# Patient Record
Sex: MALE | Race: BLACK OR AFRICAN AMERICAN | NOT HISPANIC OR LATINO | URBAN - METROPOLITAN AREA
[De-identification: names, ages, dates, MRNs, and addresses within clinical notes are randomized per-mention and may not be internally consistent; named-entity substitution may affect disease eponyms.]

---

## 2017-09-13 ENCOUNTER — EMERGENCY (EMERGENCY)
Facility: HOSPITAL | Age: 32
LOS: 1 days | Discharge: HOME | End: 2017-09-13
Admitting: EMERGENCY MEDICINE
Payer: MEDICAID

## 2017-09-13 VITALS
TEMPERATURE: 98 F | SYSTOLIC BLOOD PRESSURE: 117 MMHG | DIASTOLIC BLOOD PRESSURE: 75 MMHG | RESPIRATION RATE: 18 BRPM | OXYGEN SATURATION: 98 % | HEART RATE: 86 BPM

## 2017-09-13 PROCEDURE — 99283 EMERGENCY DEPT VISIT LOW MDM: CPT

## 2017-09-13 NOTE — ED PROVIDER NOTE - CHPI ED SYMPTOMS NEG
no suicidal/no weakness/no disorientation/no paranoia/no hallucinations/no confusion/no agitation/no homicidal/no weight loss/no change in level of consciousness

## 2017-09-13 NOTE — ED PROVIDER NOTE - PROGRESS NOTE DETAILS
Spoke with Aditi at [facility]  Case Manger Patient does not like facility and became quite and refused to speak with staff Patient sent for medication noncompliance today am medication  states " he is not a threat to himself or anyone at facility" welcomed to return.

## 2017-09-13 NOTE — ED PROVIDER NOTE - MEDICAL DECISION MAKING DETAILS
This is a 31 year old Male PMHX schizophrenia BIBA from [facility] for evaluation for medication noncompliance. Patient states he was too tired to take his Protonix this am. Patient is calm and cooperative Denies any verbal or physical altercation  Denies SI/HI Denies AH/VH Per Aditi case manage patient welcome to return no other medical concerns or complaints treat to self or staff.

## 2017-09-13 NOTE — ED ADULT TRIAGE NOTE - CHIEF COMPLAINT QUOTE
Pt sent by [facility] for med non-compliance. Pt refused to take his meds this morning. Pt calm, co-operative, denies A/VH, denies SI/HI. States he did not take his meds because he was too tired.

## 2017-09-13 NOTE — ED PROVIDER NOTE - OBJECTIVE STATEMENT
This is a 31 year old Male PMHX schizophrenia BIBA from [facility] for evaluation for medication noncompliance. Patient states he was too tired to take his Protonix this am. Patient is calm and cooperative Denies any verbal or physical altercation  Denies SI/HI Denies AH/VH

## 2017-09-18 ENCOUNTER — EMERGENCY (EMERGENCY)
Facility: HOSPITAL | Age: 32
LOS: 1 days | Discharge: HOME | End: 2017-09-18
Attending: EMERGENCY MEDICINE | Admitting: EMERGENCY MEDICINE
Payer: MEDICAID

## 2017-09-18 VITALS
RESPIRATION RATE: 16 BRPM | TEMPERATURE: 99 F | DIASTOLIC BLOOD PRESSURE: 94 MMHG | SYSTOLIC BLOOD PRESSURE: 149 MMHG | HEART RATE: 116 BPM | OXYGEN SATURATION: 100 %

## 2017-09-18 PROCEDURE — 99284 EMERGENCY DEPT VISIT MOD MDM: CPT | Mod: 25

## 2017-09-18 NOTE — ED PROVIDER NOTE - OBJECTIVE STATEMENT
31 from [place] - has been a resident there for the last 2 weeks.  Was here a few days ago for med non compliance.  Has a history of schizophrenia.  States that he continues to have visual hallucinations.  No SI AH HI.  Does report taking his medications.  Tonight asked staff member there to call EMS to see why he is still hearing voices.   Pt without any other medical complaints or ingestions reported

## 2017-09-18 NOTE — ED PROVIDER NOTE - MEDICAL DECISION MAKING DETAILS
pt with history of schizophrenia.  Self called 911.  Here with no thoughts or apparent risk of harm to self or others.  No indication for emergent psychiatric admission.  Will provide transportation back to [place].  Explained need for medication management at that facility.

## 2017-09-29 ENCOUNTER — EMERGENCY (EMERGENCY)
Facility: HOSPITAL | Age: 32
LOS: 1 days | Discharge: HOME | End: 2017-09-29
Admitting: EMERGENCY MEDICINE
Payer: MEDICAID

## 2017-09-29 VITALS
RESPIRATION RATE: 15 BRPM | HEART RATE: 72 BPM | OXYGEN SATURATION: 98 % | SYSTOLIC BLOOD PRESSURE: 132 MMHG | TEMPERATURE: 99 F | DIASTOLIC BLOOD PRESSURE: 81 MMHG

## 2017-09-29 PROCEDURE — 99284 EMERGENCY DEPT VISIT MOD MDM: CPT

## 2017-09-29 RX ORDER — TETANUS TOXOID, REDUCED DIPHTHERIA TOXOID AND ACELLULAR PERTUSSIS VACCINE, ADSORBED 5; 2.5; 8; 8; 2.5 [IU]/.5ML; [IU]/.5ML; UG/.5ML; UG/.5ML; UG/.5ML
0.5 SUSPENSION INTRAMUSCULAR ONCE
Qty: 0 | Refills: 0 | Status: COMPLETED | OUTPATIENT
Start: 2017-09-29 | End: 2017-09-29

## 2017-09-29 RX ADMIN — TETANUS TOXOID, REDUCED DIPHTHERIA TOXOID AND ACELLULAR PERTUSSIS VACCINE, ADSORBED 0.5 MILLILITER(S): 5; 2.5; 8; 8; 2.5 SUSPENSION INTRAMUSCULAR at 23:02

## 2017-09-29 NOTE — ED PROVIDER NOTE - CHPI ED SYMPTOMS NEG
no vomiting/no dizziness/no numbness/no decreased eating/drinking/no nausea/no pain/no weakness/no fever/no chills/no tingling

## 2017-09-29 NOTE — ED PROVIDER NOTE - PHYSICAL EXAMINATION
Skin: [id number] area of erythema to posterior right ankle, blister appears to be hearing well, no drainage or crepitus noted

## 2017-09-29 NOTE — ED ADULT TRIAGE NOTE - CHIEF COMPLAINT QUOTE
Pt brought in by EMS from [facility] for opened blistered to R heel.  Pt c/o slight pain.  No drainage noted.  +pedal pulse.  Pt states opened x2 days ago.  PMHx: schizophrenia

## 2017-09-29 NOTE — ED PROVIDER NOTE - OBJECTIVE STATEMENT
31yM w/ pmhx schizophrenia brought in by EMS from [facility] for an open blister. Pt states that he wore new boots 2 days ago and they were too tight, he developed a blister. Pt states that he had pain to the area and the blister opened today. Denies fevers, chills, difficulty weight bearing, chest pain, shortness of breath, abdominal pain, n/v/d and any other complaints

## 2017-09-29 NOTE — ED PROVIDER NOTE - CARE PLAN
Principal Discharge DX:	Blister  Instructions for follow-up, activity and diet:	Follow up with your primary care provider within 48 hours  Wound Care: Keep wound clean and dry, wash gently with soap and water daily then reapply bacitracin and non-stick adhesive. Use cling wrap to secure the adhesive.   Return to the emergency department with any worsening or concerning symptoms, new onset fever, red streaking, swelling to area, new drainage from site or any other concerning symptoms.

## 2017-09-29 NOTE — ED PROVIDER NOTE - MEDICAL DECISION MAKING DETAILS
31yM w/ pmhx schizophrenia presenting with an open blister to his posterior right ankle, appears to have healed well, no signs of infection, normal vital signs. Will dress wound and d/c home with PMD follow up.

## 2017-09-29 NOTE — ED PROVIDER NOTE - PLAN OF CARE
Follow up with your primary care provider within 48 hours  Wound Care: Keep wound clean and dry, wash gently with soap and water daily then reapply bacitracin and non-stick adhesive. Use cling wrap to secure the adhesive.   Return to the emergency department with any worsening or concerning symptoms, new onset fever, red streaking, swelling to area, new drainage from site or any other concerning symptoms.

## 2017-10-21 ENCOUNTER — EMERGENCY (EMERGENCY)
Facility: HOSPITAL | Age: 32
LOS: 1 days | Discharge: HOME | End: 2017-10-21
Admitting: EMERGENCY MEDICINE
Payer: MEDICAID

## 2017-10-21 VITALS
HEART RATE: 82 BPM | OXYGEN SATURATION: 99 % | SYSTOLIC BLOOD PRESSURE: 140 MMHG | TEMPERATURE: 99 F | DIASTOLIC BLOOD PRESSURE: 88 MMHG | RESPIRATION RATE: 17 BRPM

## 2017-10-21 PROCEDURE — 99284 EMERGENCY DEPT VISIT MOD MDM: CPT

## 2017-10-21 NOTE — ED ADULT TRIAGE NOTE - CHIEF COMPLAINT QUOTE
pt from [facility] states he is having paranoid hallucinations and wants his medication adjusted. pt calm and corporative in triage.  NP Arash called to triage.  pt brought directly back
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
## 2017-10-21 NOTE — ED PROVIDER NOTE - MEDICAL DECISION MAKING DETAILS
30 y/o M hx Schizo-affective Disorder  Medical evaluation performed. There is no clinical evidence of intoxication or any acute medical problem requiring immediate intervention.  To follow up with [facility].

## 2017-10-21 NOTE — ED PROVIDER NOTE - OBJECTIVE STATEMENT
30 y/o M hx Schizo-affective Disorder  BIBA  w c/o  recurrent paranoid, stating that he needs his medication adjusted. Admits that this paranoid has persisted for years.  Denies recent use of alcohol or  illicit drug. Denies SI/HI/AH/VH. Denies pain, SOB, fever, chills, chest/ abdominal discomfort. Denies falling, punching  or kicking any objects.

## 2017-10-21 NOTE — ED ADULT NURSE NOTE - CHIEF COMPLAINT QUOTE
pt from [facility] states he is having paranoid hallucinations and wants his medication adjusted. pt calm and corporative in triage.  NP Arash called to triage.  pt brought directly back

## 2017-12-21 ENCOUNTER — EMERGENCY (EMERGENCY)
Facility: HOSPITAL | Age: 32
LOS: 1 days | Discharge: HOME | End: 2017-12-21
Admitting: EMERGENCY MEDICINE
Payer: MEDICAID

## 2017-12-21 VITALS
DIASTOLIC BLOOD PRESSURE: 82 MMHG | RESPIRATION RATE: 15 BRPM | SYSTOLIC BLOOD PRESSURE: 123 MMHG | TEMPERATURE: 99 F | HEART RATE: 90 BPM | OXYGEN SATURATION: 97 %

## 2017-12-21 PROCEDURE — 71020: CPT | Mod: 26

## 2017-12-21 PROCEDURE — 99284 EMERGENCY DEPT VISIT MOD MDM: CPT | Mod: 25

## 2017-12-21 NOTE — ED PROVIDER NOTE - MEDICAL DECISION MAKING DETAILS
32 year old male with a PMHx of GERD and schizophrenia pw nasal congestion and cough for 1 day.   Plan: nasal spray, cxr

## 2017-12-21 NOTE — ED ADULT TRIAGE NOTE - CHIEF COMPLAINT QUOTE
Pt c/o nasal congestion for approx 1 day.  Denies taking any OTC meds, states unrelieved with Vicks.  Endorses slight cough, no cough noted in triage.  PMHx:  GERD, schizophrenia

## 2017-12-21 NOTE — ED PROVIDER NOTE - PLAN OF CARE
Use nasal spray as directed. Drink plenty of water - stay hydrated. Follow up with your primary care physician within 1 week for further evaluation. Return to the Emergency Department for any new, worsening or concerning symptoms.

## 2017-12-21 NOTE — ED PROVIDER NOTE - CARE PLAN
Principal Discharge DX:	Viral syndrome  Instructions for follow-up, activity and diet:	Use nasal spray as directed. Drink plenty of water - stay hydrated. Follow up with your primary care physician within 1 week for further evaluation. Return to the Emergency Department for any new, worsening or concerning symptoms.

## 2017-12-21 NOTE — ED PROVIDER NOTE - OBJECTIVE STATEMENT
32 year old male with a PMHx of GERD and schizophrenia pw nasal congestion and cough for 1 day. Pt states that he has had a dry cough and congestion that has not gotten any better with OTC medication. Endorses that he has a few close contacts with similar symptoms. Denies headache, sore throat, n/v/f/c, CP, SOB, abdominal pain, dysuria, hematuria.

## 2018-02-02 ENCOUNTER — EMERGENCY (EMERGENCY)
Facility: HOSPITAL | Age: 33
LOS: 1 days | Discharge: HOME | End: 2018-02-02
Admitting: EMERGENCY MEDICINE
Payer: MEDICAID

## 2018-02-02 VITALS — DIASTOLIC BLOOD PRESSURE: 83 MMHG | SYSTOLIC BLOOD PRESSURE: 125 MMHG

## 2018-02-02 PROCEDURE — 99283 EMERGENCY DEPT VISIT LOW MDM: CPT

## 2018-02-02 RX ORDER — HALOPERIDOL DECANOATE 100 MG/ML
5 INJECTION INTRAMUSCULAR ONCE
Qty: 0 | Refills: 0 | Status: COMPLETED | OUTPATIENT
Start: 2018-02-02 | End: 2018-02-02

## 2018-02-02 RX ADMIN — HALOPERIDOL DECANOATE 5 MILLIGRAM(S): 100 INJECTION INTRAMUSCULAR at 16:51

## 2018-02-02 RX ADMIN — Medication 2 MILLIGRAM(S): at 16:51
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
## 2018-02-02 NOTE — ED PROVIDER NOTE - MEDICAL DECISION MAKING DETAILS
33 y/o M hx Schizophrenia   Behavior likely dude to chronic schizophrenia. Admits to medication compliance.  Medical evaluation performed. There is no clinical evidence of intoxication or any acute medical problem requiring immediate intervention.
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
## 2018-02-02 NOTE — ED BEHAVIORAL HEALTH NOTE - BEHAVIORAL HEALTH NOTE
Worker arranged for transportation through [employer] return back to [facility] [address] [place]. Worker spoke to Li who authorized pickup with nicole ([phone]) for 7:00pm trip #[phone]

## 2018-03-06 ENCOUNTER — EMERGENCY (EMERGENCY)
Facility: HOSPITAL | Age: 33
LOS: 1 days | Discharge: HOME | End: 2018-03-06
Attending: EMERGENCY MEDICINE | Admitting: EMERGENCY MEDICINE
Payer: MEDICAID

## 2018-03-06 VITALS
RESPIRATION RATE: 20 BRPM | DIASTOLIC BLOOD PRESSURE: 67 MMHG | HEART RATE: 69 BPM | OXYGEN SATURATION: 98 % | TEMPERATURE: 98 F | SYSTOLIC BLOOD PRESSURE: 124 MMHG

## 2018-03-06 VITALS
TEMPERATURE: 98 F | RESPIRATION RATE: 18 BRPM | HEART RATE: 74 BPM | SYSTOLIC BLOOD PRESSURE: 125 MMHG | DIASTOLIC BLOOD PRESSURE: 68 MMHG | OXYGEN SATURATION: 100 %

## 2018-03-06 PROCEDURE — 99283 EMERGENCY DEPT VISIT LOW MDM: CPT | Mod: 25

## 2018-03-06 NOTE — ED PROVIDER NOTE - OBJECTIVE STATEMENT
33 yo from [address] at [place] reporting that he has a lot of stress building up with people in the house.  He feels safe and there is no SI HI AH VH.  Takes his meds as prescribed.  Is asking to have a few hours to relax and stay here prior to going back.  Denies any ingestions.  Presented on his own accord.

## 2018-03-06 NOTE — ED ADULT TRIAGE NOTE - CHIEF COMPLAINT QUOTE
As per pt  he is under a lot of stress for the past 5 months and wants to talk to a psychiatrist. Denies any homicidal or suicidal ideation. Denies drug or alcohol use. Pt claims that he takes his psych meds regularly. PMH of schizophrenia.

## 2018-03-06 NOTE — ED ADULT NURSE NOTE - OBJECTIVE STATEMENT
pt recd to behavioral health a*ox3 ambulatory, pmh schizophrenia, states he has been feeling stressed and would like to decompress. pt denies any suicidal or homicidal ideation, no auditory or visual hallucinations, patient offers no other complaints of pain or discomfort and is calm and cooperative with interview, vitals stable resps even and uinlabored. pts belongings secured in closet, will ctm and maintain safety while in .

## 2018-03-06 NOTE — ED ADULT NURSE REASSESSMENT NOTE - NS ED NURSE REASSESS COMMENT FT1
pt discharged, paperwork signed, ambulatory without assist, pt verbalizes that he will take q46 back to [facility], pt denies any medical or psychiatric complaints, all belongings returned, pt escorted to bus stop.

## 2018-03-06 NOTE — ED PROVIDER NOTE - MEDICAL DECISION MAKING DETAILS
pt presenting from [place].  No acute medical or psychaitric interventions needed. Will allow to rest here and then reassess.

## 2018-03-10 ENCOUNTER — EMERGENCY (EMERGENCY)
Facility: HOSPITAL | Age: 33
LOS: 1 days | Discharge: HOME | End: 2018-03-10
Attending: EMERGENCY MEDICINE | Admitting: EMERGENCY MEDICINE
Payer: MEDICAID

## 2018-03-10 VITALS
RESPIRATION RATE: 18 BRPM | SYSTOLIC BLOOD PRESSURE: 117 MMHG | HEART RATE: 59 BPM | TEMPERATURE: 98 F | DIASTOLIC BLOOD PRESSURE: 81 MMHG | OXYGEN SATURATION: 100 %

## 2018-03-10 VITALS
SYSTOLIC BLOOD PRESSURE: 123 MMHG | RESPIRATION RATE: 16 BRPM | OXYGEN SATURATION: 100 % | HEART RATE: 67 BPM | TEMPERATURE: 98 F | DIASTOLIC BLOOD PRESSURE: 80 MMHG

## 2018-03-10 PROCEDURE — 99283 EMERGENCY DEPT VISIT LOW MDM: CPT | Mod: 25

## 2018-03-10 NOTE — ED ADULT NURSE NOTE - CHIEF COMPLAINT QUOTE
Pt C/O increased stress for last few days. Pt denies SI/HI, hallucinations, drug/ ETOH use, pain or any medical complaints at this time. Pt has PMH Schizophrenia, states up to date with all meds. Pt resides at [facility] building [address]. Pt Calm and cooperative in triage. Triage eval by MD Esteban: Pt OK to go to . Pt transported to .

## 2018-03-10 NOTE — ED PROVIDER NOTE - MEDICAL DECISION MAKING DETAILS
no medical complaints; no acute psychiatric condition to warrant psych consultation; will have pt speak to psychiatrist at [place]

## 2018-03-10 NOTE — ED PROVIDER NOTE - OBJECTIVE STATEMENT
32 yr old male with hx of anxiety presents from [place] for increasing stressors.  Denies HI/SI.  Denies taking any additional medications today. 32 yr old male with hx of schizophrenia, GERD presents from [place] for increasing stressors.  Denies HI/SI.  Denies taking any additional medications today.

## 2018-03-10 NOTE — ED ADULT TRIAGE NOTE - CHIEF COMPLAINT QUOTE
Pt C/O increased stress for last few days. Pt denies SI/HI, hallucinations, drug/ ETOH use, pain or any medical complaints at this time. Pt has PMH Schizophrenia, states up to date with all meds. Pt resides at [facility] building [address]. Pt Calm and cooperative in triage. Triage eval by MD Esteban: Pt OK to go to . Pt transported to .

## 2018-03-13 ENCOUNTER — EMERGENCY (EMERGENCY)
Facility: HOSPITAL | Age: 33
LOS: 1 days | Discharge: HOME | End: 2018-03-13
Admitting: EMERGENCY MEDICINE
Payer: MEDICAID

## 2018-03-13 VITALS
RESPIRATION RATE: 18 BRPM | HEART RATE: 91 BPM | DIASTOLIC BLOOD PRESSURE: 92 MMHG | OXYGEN SATURATION: 99 % | SYSTOLIC BLOOD PRESSURE: 134 MMHG | TEMPERATURE: 98 F

## 2018-03-13 PROCEDURE — 99283 EMERGENCY DEPT VISIT LOW MDM: CPT | Mod: 25

## 2018-03-13 NOTE — ED PROVIDER NOTE - MEDICAL DECISION MAKING DETAILS
This is a 32 yr old male with hx of schizophrenia, GERD presents from [place] for increasing stressors.  Patient confirms that he stated to triage RN " I want humans to be extinct." Patient is vague and elusive with questions. Asking for admission to [facility]. Medical evaluation performed. There is no clinical evidence of intoxication or any acute medical problem requiring immediate intervention. Final disposition will be determined by psychiatrist.

## 2018-03-13 NOTE — ED ADULT NURSE NOTE - OBJECTIVE STATEMENT
pt arrives a*ox3 ambulatory to , states he is "feeling stressed at home and needs to relax," states he "just doesn't like some people," denies any si hi ah vh. calm and cooperative with ed staff interventions, states he is "sometimes," compliant with medication, nad noted. awaiting medical and psych eval. will ctm and reassess, clothing secured, will maintain safety.

## 2018-03-13 NOTE — ED ADULT TRIAGE NOTE - CHIEF COMPLAINT QUOTE
Pt states "I came to the hospital today because I am feeling stressed out". Denies SI or HI but states he would like "humans to be extinct". PMH schizophrenia. Takes his medications "sometimes". Pt will be seen in .

## 2018-03-13 NOTE — ED BEHAVIORAL HEALTH NOTE - BEHAVIORAL HEALTH NOTE
Writer called Lianet Magana, [phone], and left a voicemail message, requesting a return call to provide collateral information, but was not able to speak with her. Marisolr called [facility], [place] [address], at [place], the patient's [facility] facility, [phone], and was told the on-call  is Cecilia Rios, [phone]. Marisolr called her and left a voicemail message but was not able to speak with her. Marisolr left a number for her to call after the end of 's shift to speak with the evaluating psychiatrist.

## 2018-03-14 VITALS
TEMPERATURE: 98 F | HEART RATE: 78 BPM | RESPIRATION RATE: 17 BRPM | SYSTOLIC BLOOD PRESSURE: 122 MMHG | OXYGEN SATURATION: 99 % | DIASTOLIC BLOOD PRESSURE: 78 MMHG

## 2018-03-14 NOTE — ED ADULT NURSE REASSESSMENT NOTE - NS ED NURSE REASSESS COMMENT FT1
patient cleared by psych and medicine, verbalizes he will take the q46 home to [facility]. denies complaints at this time.

## 2018-09-30 NOTE — ED ADULT NURSE NOTE - OBJECTIVE STATEMENT
Addended by: ALTON CHI on: 9/30/2018 12:40 PM     Modules accepted: Orders     31 yo M received in .  pt is A&Ox4.  Pt is well known to  ED and states he has been feeling stressed and would like to decompress.  Pt denies any suicidal or homicidal ideation, no auditory or visual hallucinations, patient offers no other complaints of pain or discomfort and is calm and cooperative with interview, vitals stable resps even and uinlabored. pts belongings secured in closet, will ctm and maintain safety while in .

## 2018-12-16 ENCOUNTER — EMERGENCY (EMERGENCY)
Facility: HOSPITAL | Age: 33
LOS: 1 days | Discharge: HOME | End: 2018-12-16
Attending: EMERGENCY MEDICINE | Admitting: EMERGENCY MEDICINE
Payer: MEDICAID

## 2018-12-16 VITALS
WEIGHT: 147.93 LBS | DIASTOLIC BLOOD PRESSURE: 73 MMHG | OXYGEN SATURATION: 99 % | HEIGHT: 68 IN | HEART RATE: 83 BPM | RESPIRATION RATE: 16 BRPM | TEMPERATURE: 98 F | SYSTOLIC BLOOD PRESSURE: 145 MMHG

## 2018-12-16 PROBLEM — F20.9 SCHIZOPHRENIA, UNSPECIFIED: Chronic | Status: ACTIVE | Noted: 2017-09-18

## 2018-12-16 PROBLEM — K21.9 GASTRO-ESOPHAGEAL REFLUX DISEASE WITHOUT ESOPHAGITIS: Chronic | Status: ACTIVE | Noted: 2017-12-21

## 2018-12-16 PROCEDURE — 99282 EMERGENCY DEPT VISIT SF MDM: CPT | Mod: 25

## 2018-12-16 NOTE — ED ADULT TRIAGE NOTE - CHIEF COMPLAINT QUOTE
Pt arrives to ED c/o feeling depressed for the last 2 weeks.  Pt denies current SI/HI and denies auditory or visual hallucinations.  Pt reports that a few years ago he did experience SI/HI and tried putting himself in "harm's way." Pt has hx of schizophrenia paranoid type.  Pt denies ETOH or drug use.  Pt takes injection for his Schizophrenia.  Pt calm and cooperative in triage. Pt arrives to ED c/o feeling depressed for the last 2 weeks.  Pt denies current SI/HI and denies auditory or visual hallucinations.  Pt reports that a few years ago he did experience SI/HI and tried putting himself in "harm's way." Pt has hx of schizophrenia paranoid type.  Pt denies ETOH or drug use.  Pt takes injection for his Schizophrenia.  Pt calm and cooperative in triage.  Attending CHRISTIAN Del Angel to evaluate pt in triage. Pt arrives to ED c/o feeling depressed for the last 2 weeks.  Pt denies current SI/HI and denies auditory or visual hallucinations.  Pt reports that a few years ago he did experience SI/HI and tried putting himself in "harm's way." Pt has hx of schizophrenia paranoid type.  Pt denies ETOH or drug use.  Pt takes injection for his Schizophrenia.  Pt calm and cooperative in triage.  Attending CHRISTIAN Del Angel to evaluate pt in triage.  Pt lives in Group home and reports he does not like it there.

## 2018-12-16 NOTE — ED ADULT NURSE NOTE - OBJECTIVE STATEMENT
BIB self from [place] residence, states "I just needed to get away for a while". Pt arrives awake, a&ox3, calm with flat affect. Pt denies s/i h/i or a/v/h. Denies physical complaints. Pt reports compliance with medications prescribed. No reported drug/etoh use.

## 2018-12-16 NOTE — ED PROVIDER NOTE - OBJECTIVE STATEMENT
32 y/o M with h/o schizophrenia compliant with LOVELL presents c/o problems at his residence.  Pt lives at [facility] [address] [phone]), states he has been there for 9 months.  Pt is vague with history, but describes some problems at his residence.  Pt initially c/o depression in triage, but upon further evaluation he reports he has been feeling this way "for a while" with no new changes.  He denies current SI/HI, AH/VH.  No recent drug or alcohol use.  Upon further questioning, he states he just wanted to "get away" for a while and is requesting to stay in ER for a couple hours.

## 2018-12-16 NOTE — ED ADULT NURSE NOTE - NSIMPLEMENTINTERV_GEN_ALL_ED
Implemented All Universal Safety Interventions:  [place] to call system. Call bell, personal items and telephone within reach. Instruct patient to call for assistance. Room bathroom lighting operational. Non-slip footwear when patient is off stretcher. Physically safe environment: no spills, clutter or unnecessary equipment. Stretcher in lowest position, wheels locked, appropriate side rails in place.

## 2018-12-16 NOTE — ED PROVIDER NOTE - MEDICAL DECISION MAKING DETAILS
34 y/o M presenting with vague complaints, reports he is trying to "get away" as he is unhappy at his residence and needs a night away (pt with mult ER visits for same).  Offered SW for long-term assistance with residence, but pt states that "I have the ACT team for that".  There is no e/o acute intoxication or psychiatric decompensation.  Pt requesting metrocard to return to residence.

## 2018-12-16 NOTE — ED ADULT NURSE NOTE - CHIEF COMPLAINT QUOTE
Pt arrives to ED c/o feeling depressed for the last 2 weeks.  Pt denies current SI/HI and denies auditory or visual hallucinations.  Pt reports that a few years ago he did experience SI/HI and tried putting himself in "harm's way." Pt has hx of schizophrenia paranoid type.  Pt denies ETOH or drug use.  Pt takes injection for his Schizophrenia.  Pt calm and cooperative in triage.  Attending CHRISTIAN Del Angel to evaluate pt in triage.  Pt lives in Group home and reports he does not like it there.

## 2018-12-16 NOTE — ED PROVIDER NOTE - NSFOLLOWUPINSTRUCTIONS_ED_ALL_ED_FT
Follow-up with your psychiatrist.  You can also follow-up at the [facility] [phone].  Return to the emergency department if you develop any new or worsening symptoms, thoughts of hurting yourself or others, or any other concerns.

## 2020-02-27 NOTE — ED PROVIDER NOTE - NS ED MD DISPO DISCHARGE
Per monthly Recall Report,  Letter was mailed to patient to inform them that they are due for an appointment with Dr James Latham.    Reason for visit: Return in about 3 months (around 3/18/2020).  Date last seen: 12-     Home

## 2020-08-18 ENCOUNTER — EMERGENCY (EMERGENCY)
Facility: HOSPITAL | Age: 35
LOS: 1 days | Discharge: NURSING, REHAB OR LONG-TERM CARE | End: 2020-08-18
Admitting: EMERGENCY MEDICINE
Payer: MEDICAID

## 2020-08-18 VITALS
OXYGEN SATURATION: 100 % | RESPIRATION RATE: 16 BRPM | SYSTOLIC BLOOD PRESSURE: 132 MMHG | TEMPERATURE: 98 F | HEART RATE: 97 BPM | DIASTOLIC BLOOD PRESSURE: 81 MMHG

## 2020-08-18 LAB
ALBUMIN SERPL ELPH-MCNC: 5 G/DL — SIGNIFICANT CHANGE UP (ref 3.3–5)
ALP SERPL-CCNC: 71 U/L — SIGNIFICANT CHANGE UP (ref 40–120)
ALT FLD-CCNC: 25 U/L — SIGNIFICANT CHANGE UP (ref 4–41)
AMPHET UR-MCNC: NEGATIVE — SIGNIFICANT CHANGE UP
ANION GAP SERPL CALC-SCNC: 13 MMO/L — SIGNIFICANT CHANGE UP (ref 7–14)
APAP SERPL-MCNC: < 15 UG/ML — LOW (ref 15–25)
APPEARANCE UR: CLEAR — SIGNIFICANT CHANGE UP
AST SERPL-CCNC: 25 U/L — SIGNIFICANT CHANGE UP (ref 4–40)
BARBITURATES UR SCN-MCNC: NEGATIVE — SIGNIFICANT CHANGE UP
BASOPHILS # BLD AUTO: 0.02 K/UL — SIGNIFICANT CHANGE UP (ref 0–0.2)
BASOPHILS NFR BLD AUTO: 0.5 % — SIGNIFICANT CHANGE UP (ref 0–2)
BENZODIAZ UR-MCNC: NEGATIVE — SIGNIFICANT CHANGE UP
BILIRUB SERPL-MCNC: 0.2 MG/DL — SIGNIFICANT CHANGE UP (ref 0.2–1.2)
BILIRUB UR-MCNC: NEGATIVE — SIGNIFICANT CHANGE UP
BLOOD UR QL VISUAL: NEGATIVE — SIGNIFICANT CHANGE UP
BUN SERPL-MCNC: 13 MG/DL — SIGNIFICANT CHANGE UP (ref 7–23)
CALCIUM SERPL-MCNC: 9.7 MG/DL — SIGNIFICANT CHANGE UP (ref 8.4–10.5)
CANNABINOIDS UR-MCNC: NEGATIVE — SIGNIFICANT CHANGE UP
CHLORIDE SERPL-SCNC: 105 MMOL/L — SIGNIFICANT CHANGE UP (ref 98–107)
CO2 SERPL-SCNC: 23 MMOL/L — SIGNIFICANT CHANGE UP (ref 22–31)
COCAINE METAB.OTHER UR-MCNC: NEGATIVE — SIGNIFICANT CHANGE UP
COLOR SPEC: YELLOW — SIGNIFICANT CHANGE UP
CREAT SERPL-MCNC: 1.13 MG/DL — SIGNIFICANT CHANGE UP (ref 0.5–1.3)
EOSINOPHIL # BLD AUTO: 0.07 K/UL — SIGNIFICANT CHANGE UP (ref 0–0.5)
EOSINOPHIL NFR BLD AUTO: 1.7 % — SIGNIFICANT CHANGE UP (ref 0–6)
ETHANOL BLD-MCNC: < 10 MG/DL — SIGNIFICANT CHANGE UP
GLUCOSE SERPL-MCNC: 127 MG/DL — HIGH (ref 70–99)
GLUCOSE UR-MCNC: NEGATIVE — SIGNIFICANT CHANGE UP
HCT VFR BLD CALC: 38.9 % — LOW (ref 39–50)
HGB BLD-MCNC: 12.7 G/DL — LOW (ref 13–17)
IMM GRANULOCYTES NFR BLD AUTO: 0.2 % — SIGNIFICANT CHANGE UP (ref 0–1.5)
KETONES UR-MCNC: NEGATIVE — SIGNIFICANT CHANGE UP
LEUKOCYTE ESTERASE UR-ACNC: NEGATIVE — SIGNIFICANT CHANGE UP
LYMPHOCYTES # BLD AUTO: 1.2 K/UL — SIGNIFICANT CHANGE UP (ref 1–3.3)
LYMPHOCYTES # BLD AUTO: 29.6 % — SIGNIFICANT CHANGE UP (ref 13–44)
MCHC RBC-ENTMCNC: 26.4 PG — LOW (ref 27–34)
MCHC RBC-ENTMCNC: 32.6 % — SIGNIFICANT CHANGE UP (ref 32–36)
MCV RBC AUTO: 80.9 FL — SIGNIFICANT CHANGE UP (ref 80–100)
METHADONE UR-MCNC: NEGATIVE — SIGNIFICANT CHANGE UP
MONOCYTES # BLD AUTO: 0.37 K/UL — SIGNIFICANT CHANGE UP (ref 0–0.9)
MONOCYTES NFR BLD AUTO: 9.1 % — SIGNIFICANT CHANGE UP (ref 2–14)
NEUTROPHILS # BLD AUTO: 2.39 K/UL — SIGNIFICANT CHANGE UP (ref 1.8–7.4)
NEUTROPHILS NFR BLD AUTO: 58.9 % — SIGNIFICANT CHANGE UP (ref 43–77)
NITRITE UR-MCNC: NEGATIVE — SIGNIFICANT CHANGE UP
NRBC # FLD: 0 K/UL — SIGNIFICANT CHANGE UP (ref 0–0)
OPIATES UR-MCNC: NEGATIVE — SIGNIFICANT CHANGE UP
OXYCODONE UR-MCNC: NEGATIVE — SIGNIFICANT CHANGE UP
PCP UR-MCNC: NEGATIVE — SIGNIFICANT CHANGE UP
PH UR: 6.5 — SIGNIFICANT CHANGE UP (ref 5–8)
PLATELET # BLD AUTO: 176 K/UL — SIGNIFICANT CHANGE UP (ref 150–400)
PMV BLD: 11.2 FL — SIGNIFICANT CHANGE UP (ref 7–13)
POTASSIUM SERPL-MCNC: 3.9 MMOL/L — SIGNIFICANT CHANGE UP (ref 3.5–5.3)
POTASSIUM SERPL-SCNC: 3.9 MMOL/L — SIGNIFICANT CHANGE UP (ref 3.5–5.3)
PROT SERPL-MCNC: 7.5 G/DL — SIGNIFICANT CHANGE UP (ref 6–8.3)
PROT UR-MCNC: 10 — SIGNIFICANT CHANGE UP
RBC # BLD: 4.81 M/UL — SIGNIFICANT CHANGE UP (ref 4.2–5.8)
RBC # FLD: 13.5 % — SIGNIFICANT CHANGE UP (ref 10.3–14.5)
SALICYLATES SERPL-MCNC: < 5 MG/DL — LOW (ref 15–30)
SODIUM SERPL-SCNC: 141 MMOL/L — SIGNIFICANT CHANGE UP (ref 135–145)
SP GR SPEC: 1.02 — SIGNIFICANT CHANGE UP (ref 1–1.04)
TSH SERPL-MCNC: 0.46 UIU/ML — SIGNIFICANT CHANGE UP (ref 0.27–4.2)
UROBILINOGEN FLD QL: NORMAL — SIGNIFICANT CHANGE UP
WBC # BLD: 4.06 K/UL — SIGNIFICANT CHANGE UP (ref 3.8–10.5)
WBC # FLD AUTO: 4.06 K/UL — SIGNIFICANT CHANGE UP (ref 3.8–10.5)

## 2020-08-18 PROCEDURE — 90792 PSYCH DIAG EVAL W/MED SRVCS: CPT

## 2020-08-18 PROCEDURE — 99285 EMERGENCY DEPT VISIT HI MDM: CPT

## 2020-08-18 NOTE — ED PROVIDER NOTE - NSFOLLOWUPINSTRUCTIONS_ED_ALL_ED_FT
Please follow up with your primary care doctor after you leave the emergency department so that they can follow up and conduct more testing and treatment as they deem necessary. If you have worsening signs or symptoms of what you came in to the Emergency Department today and are not able to see your doctor, go to your nearest emergency department or return to the [facility] emergency department for further care and management.
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
## 2020-08-18 NOTE — ED BEHAVIORAL HEALTH ASSESSMENT NOTE - ORIENTED TO TIME
Patient presented to office for follow up appointment 3/11/2020. Patient needs 2 additional infusions of solumedrol 500 mg in office. May need to go to cancer center tomorrow. No

## 2020-08-18 NOTE — ED BEHAVIORAL HEALTH ASSESSMENT NOTE - CASE SUMMARY
34/M  with hx of Schizophrenia, hx of noncompliance to meds, hx of in-pt psych admissions and no substance abuse hx.  Today, BIB EMS due to agitation.  At this time, he is not manifesting symptoms of severe MDD, acute william or florid psychotic.  Pt reports being upset by visit from the ACT team which he feels, is "invading his privacy".  Despite being irate, there was no escalation towards violence.  Pt was able to emotionally regulate (even on a limited basis).  Since his  ED arrival, the Pt has been calm and cooperative.  There has been recurrence of agitation/aggressive behavior.  Currently, no verbalization of active/ passive SI/HI.   Pt is not showing any signs/symptoms of intoxication or withdrawal.  Pt is not delirious.  He has not tested limits.. Has maintained appropriate boundaries. Pt has been easily redirected.  Collateral information was obtained from [facility] staff/ ACT Team member who both reported there has been no reported changes in Pt's baseline behavior.  Pt's  baseline is guarded and indifferent. Pt said to not share much. Pt reportedly is in denial of mental illness.  There was no opposition towards Pt being discharged back to the community.  Pt was able to partake towards safety planning.  Hence, Pt can be discharged back to the community.    RECOMMENDATIONS:        1. Psychoeducation provided.  Encouraged to be compliant with prescribed psych meds for control of symptoms.   No indication towards changing psych meds at this time.        2. Emergency protocol reviewed.  Pt was adviced to call 911 or come to the nearest ED should symptoms worsen; have increasing bouts of agitation/aggressive behavior; having SI/HI; or call [phone]       3. follow up with AOT - [place] ACT team as scheduled

## 2020-08-18 NOTE — ED BEHAVIORAL HEALTH ASSESSMENT NOTE - SUMMARY
Patient is a 35 y/o single AA male, domiciled on the grounds of [place] [address], PPH of Schizophrenia, denies past suicide attempts, denies recent substance abuse, biba, called by residence for psychiatric evaluation.  Patient has been calm and cooperative since his arrival to . He denies recent or current depressed mood or thoughts of hurting self or others. Patient also denies symptoms of william or psychosis. He does not appear to be manic or to be responding to internal stimuli. Patient does not appear to be an imminent danger to self or others and does not require inpatient hospitalization at this time. Patient is a 35 y/o single AA male, domiciled on the grounds of [place] [address], PPH of Schizophrenia, denies past suicide attempts, denies recent substance abuse, biba, called by residence for psychiatric evaluation.    Patient has been calm and cooperative since his arrival to . He denies recent or current depressed mood or thoughts of hurting self or others. Patient also denies symptoms of william or psychosis. He does not appear to be manic or to be responding to internal stimuli. Patient does not appear to be an imminent danger to self or others and does not require inpatient hospitalization at this time.
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
## 2020-08-18 NOTE — ED BEHAVIORAL HEALTH ASSESSMENT NOTE - DESCRIPTION
calm and cooperative  ICU Vital Signs Last 24 Hrs  T(C): 36.8 (18 Aug 2020 19:11), Max: 36.8 (18 Aug 2020 19:11)  T(F): 98.3 (18 Aug 2020 19:11), Max: 98.3 (18 Aug 2020 19:11)  HR: 97 (18 Aug 2020 19:11) (97 - 97)  BP: 132/81 (18 Aug 2020 19:11) (132/81 - 132/81)  BP(mean): --  ABP: --  ABP(mean): --  RR: 16 (18 Aug 2020 19:11) (16 - 16)  SpO2: 100% (18 Aug 2020 19:11) (100% - 100%) GERD Lives on the grounds of [facility], [address], disabled

## 2020-08-18 NOTE — ED BEHAVIORAL HEALTH ASSESSMENT NOTE - RISK ASSESSMENT
H/o of schizophrenia and noncompliance with medication he is receiving a LOVELL, on AOT and has supportive residence. Low Acute Suicide Risk
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
## 2020-08-18 NOTE — ED PROVIDER NOTE - PATIENT PORTAL LINK FT
You can access the FollowMyHealth Patient Portal offered by [facility] by registering at the following website: [email or web address]. By joining [email or web address]’s FollowMyHealth portal, you will also be able to view your health information using other applications (apps) compatible with our system.

## 2020-08-18 NOTE — ED PROVIDER NOTE - CLINICAL SUMMARY MEDICAL DECISION MAKING FREE TEXT BOX
Labs, Urine Tox/UA, EKG   Medical evaluation performed. There is no clinical evidence of intoxication or any acute medical problem requiring immediate intervention. Patient is awaiting psychiatric consultation. Final disposition will be determined by psychiatrist.
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
## 2020-08-18 NOTE — ED BEHAVIORAL HEALTH ASSESSMENT NOTE - ADULT OR CHILD PROTECTIVE SERVICES INVOLVEMENT
Patient seen and examined. I have reviewed the history and physical and examined the patient and find no relevant changes. Surgery plan reviewed  Site marked and consent verified. All questions answered and antibiotic verified. Ready for right total knee arthroplasty    Yoselin Suero.  Aurelio, [address] [place] [address] and Sports Medicine  01/28/20  6:52 AM
No

## 2020-08-18 NOTE — ED BEHAVIORAL HEALTH ASSESSMENT NOTE - VIOLENCE RISK FACTORS:
Feeling of being under threat and being unable to control threat/Violent ideation/threat/speech/Lack of insight into violence risk/need for treatment

## 2020-08-18 NOTE — ED PROVIDER NOTE - PROGRESS NOTE DETAILS
MICHELLE: I was signed out this pt pending PSYCH recs. Per psych, pt is cleared to be discharged back to [facility]. See Psych note for details. Pt has no medical complaints and labs are unremarkable. Will discharge back to [facility].

## 2020-08-18 NOTE — ED BEHAVIORAL HEALTH ASSESSMENT NOTE - SUICIDE PROTECTIVE FACTORS
Identifies reasons for living/Has future plans/Supportive social network of family or friends/Fear of death or the actual act of killing self

## 2020-08-19 DIAGNOSIS — F48.9 NONPSYCHOTIC MENTAL DISORDER, UNSPECIFIED: ICD-10-CM

## 2020-08-19 DIAGNOSIS — F20.9 SCHIZOPHRENIA, UNSPECIFIED: ICD-10-CM

## 2020-08-19 LAB — SARS-COV-2 RNA SPEC QL NAA+PROBE: SIGNIFICANT CHANGE UP

## 2020-08-19 NOTE — ED BEHAVIORAL HEALTH NOTE - BEHAVIORAL HEALTH NOTE
As per psychiatry, pt cleared for discharge at this time. Writer contacted [facility] [address] at [phone] and spoke with Clifford. Clifford informed of pt's discharge. No concerns reported. Mode of transportation as confirmed prior is INDEPENDENTLY via [employer].  ACT team also contacted and notified of discharge.     Writer arranged d/c transportation via [employer] online portal. Transportation arranged with [employer] #[phone] with invoice #[id number]. Artemio contacted providing ETA of 25-30 minutes. As per psychiatry, pt cleared for discharge at this time. Writer contacted [facility] [address] at [phone] and spoke with Clifford. Clifford informed of pt's discharge. No concerns reported. Mode of transportation as confirmed prior is INDEPENDENTLY via [employer].  ACT team also contacted with VM left.      Writer arranged d/c transportation via [employer] online portal. Transportation arranged with [employer] #[phone] with invoice #[id number]. Artemio contacted providing ETA of 25-30 minutes.

## 2020-08-21 LAB
SARS-COV-2 IGG SERPL IA-ACNC: 0.7 RATIO — SIGNIFICANT CHANGE UP
SARS-COV-2 IGG SERPL QL IA: NEGATIVE — SIGNIFICANT CHANGE UP
SARS-COV-2 IGG SERPL QL IA: NEGATIVE — SIGNIFICANT CHANGE UP
SARS-COV-2 IGM SERPL IA-ACNC: 0.25 RATIO — SIGNIFICANT CHANGE UP

## 2021-07-11 NOTE — ED PROVIDER NOTE - NS ED ATTENDING STATEMENT MOD
Pt was offered crutches and pt declines.  States she has a walker at home and also a rollator to use when grocery shopping Attending Only

## 2021-09-30 ENCOUNTER — EMERGENCY (EMERGENCY)
Facility: HOSPITAL | Age: 36
LOS: 0 days | Discharge: HOME | End: 2021-09-30
Attending: EMERGENCY MEDICINE
Payer: MEDICAID

## 2021-09-30 VITALS
RESPIRATION RATE: 16 BRPM | DIASTOLIC BLOOD PRESSURE: 83 MMHG | TEMPERATURE: 98 F | SYSTOLIC BLOOD PRESSURE: 128 MMHG | OXYGEN SATURATION: 97 % | HEART RATE: 75 BPM

## 2021-09-30 VITALS
OXYGEN SATURATION: 98 % | HEART RATE: 73 BPM | WEIGHT: 139.99 LBS | SYSTOLIC BLOOD PRESSURE: 120 MMHG | DIASTOLIC BLOOD PRESSURE: 82 MMHG | TEMPERATURE: 98 F | RESPIRATION RATE: 19 BRPM | HEIGHT: 68 IN

## 2021-09-30 DIAGNOSIS — F32.9 MAJOR DEPRESSIVE DISORDER, SINGLE EPISODE, UNSPECIFIED: ICD-10-CM

## 2021-09-30 DIAGNOSIS — Z00.00 ENCOUNTER FOR GENERAL ADULT MEDICAL EXAMINATION WITHOUT ABNORMAL FINDINGS: ICD-10-CM

## 2021-09-30 DIAGNOSIS — K21.9 GASTRO-ESOPHAGEAL REFLUX DISEASE WITHOUT ESOPHAGITIS: ICD-10-CM

## 2021-09-30 DIAGNOSIS — F20.9 SCHIZOPHRENIA, UNSPECIFIED: ICD-10-CM

## 2021-09-30 DIAGNOSIS — Z20.822 CONTACT WITH AND (SUSPECTED) EXPOSURE TO COVID-19: ICD-10-CM

## 2021-09-30 LAB
ALBUMIN SERPL ELPH-MCNC: 4.2 G/DL — SIGNIFICANT CHANGE UP (ref 3.3–5)
ALP SERPL-CCNC: 79 U/L — SIGNIFICANT CHANGE UP (ref 40–120)
ALT FLD-CCNC: 23 U/L — SIGNIFICANT CHANGE UP (ref 12–78)
AMPHET UR-MCNC: NEGATIVE — SIGNIFICANT CHANGE UP
ANION GAP SERPL CALC-SCNC: 5 MMOL/L — SIGNIFICANT CHANGE UP (ref 5–17)
ANISOCYTOSIS BLD QL: SLIGHT — SIGNIFICANT CHANGE UP
APAP SERPL-MCNC: < 2 UG/ML (ref 10–30)
AST SERPL-CCNC: 22 U/L — SIGNIFICANT CHANGE UP (ref 15–37)
BARBITURATES UR SCN-MCNC: NEGATIVE — SIGNIFICANT CHANGE UP
BASOPHILS # BLD AUTO: 0 K/UL — SIGNIFICANT CHANGE UP (ref 0–0.2)
BASOPHILS NFR BLD AUTO: 0 % — SIGNIFICANT CHANGE UP (ref 0–2)
BENZODIAZ UR-MCNC: NEGATIVE — SIGNIFICANT CHANGE UP
BILIRUB SERPL-MCNC: 0.3 MG/DL — SIGNIFICANT CHANGE UP (ref 0.2–1.2)
BUN SERPL-MCNC: 15 MG/DL — SIGNIFICANT CHANGE UP (ref 7–23)
CALCIUM SERPL-MCNC: 9.8 MG/DL — SIGNIFICANT CHANGE UP (ref 8.5–10.1)
CHLORIDE SERPL-SCNC: 104 MMOL/L — SIGNIFICANT CHANGE UP (ref 96–108)
CO2 SERPL-SCNC: 29 MMOL/L — SIGNIFICANT CHANGE UP (ref 22–31)
COCAINE METAB.OTHER UR-MCNC: NEGATIVE — SIGNIFICANT CHANGE UP
CREAT SERPL-MCNC: 0.64 MG/DL — SIGNIFICANT CHANGE UP (ref 0.5–1.3)
EOSINOPHIL # BLD AUTO: 0.12 K/UL — SIGNIFICANT CHANGE UP (ref 0–0.5)
EOSINOPHIL NFR BLD AUTO: 4 % — SIGNIFICANT CHANGE UP (ref 0–6)
ETHANOL SERPL-MCNC: <10 MG/DL — SIGNIFICANT CHANGE UP (ref 0–10)
FLUAV AG NPH QL: SIGNIFICANT CHANGE UP
FLUBV AG NPH QL: SIGNIFICANT CHANGE UP
GLUCOSE SERPL-MCNC: 87 MG/DL — SIGNIFICANT CHANGE UP (ref 70–99)
HCT VFR BLD CALC: 41.8 % — SIGNIFICANT CHANGE UP (ref 39–50)
HGB BLD-MCNC: 13.1 G/DL — SIGNIFICANT CHANGE UP (ref 13–17)
LYMPHOCYTES # BLD AUTO: 1.61 K/UL — SIGNIFICANT CHANGE UP (ref 1–3.3)
LYMPHOCYTES # BLD AUTO: 53 % — HIGH (ref 13–44)
MANUAL SMEAR VERIFICATION: SIGNIFICANT CHANGE UP
MCHC RBC-ENTMCNC: 25.4 PG — LOW (ref 27–34)
MCHC RBC-ENTMCNC: 31.3 GM/DL — LOW (ref 32–36)
MCV RBC AUTO: 81 FL — SIGNIFICANT CHANGE UP (ref 80–100)
METHADONE UR-MCNC: NEGATIVE — SIGNIFICANT CHANGE UP
MONOCYTES # BLD AUTO: 0 K/UL — SIGNIFICANT CHANGE UP (ref 0–0.9)
MONOCYTES NFR BLD AUTO: 0 % — LOW (ref 2–14)
NEUTROPHILS # BLD AUTO: 1.18 K/UL — LOW (ref 1.8–7.4)
NEUTROPHILS NFR BLD AUTO: 39 % — LOW (ref 43–77)
NRBC # BLD: 0 /100 — SIGNIFICANT CHANGE UP (ref 0–0)
NRBC # BLD: SIGNIFICANT CHANGE UP /100 WBCS (ref 0–0)
OPIATES UR-MCNC: NEGATIVE — SIGNIFICANT CHANGE UP
PCP SPEC-MCNC: SIGNIFICANT CHANGE UP
PCP UR-MCNC: NEGATIVE — SIGNIFICANT CHANGE UP
PLAT MORPH BLD: NORMAL — SIGNIFICANT CHANGE UP
PLATELET # BLD AUTO: 177 K/UL — SIGNIFICANT CHANGE UP (ref 150–400)
POTASSIUM SERPL-MCNC: 3.9 MMOL/L — SIGNIFICANT CHANGE UP (ref 3.5–5.3)
POTASSIUM SERPL-SCNC: 3.9 MMOL/L — SIGNIFICANT CHANGE UP (ref 3.5–5.3)
PROT SERPL-MCNC: 8 GM/DL — SIGNIFICANT CHANGE UP (ref 6–8.3)
RBC # BLD: 5.16 M/UL — SIGNIFICANT CHANGE UP (ref 4.2–5.8)
RBC # FLD: 13.5 % — SIGNIFICANT CHANGE UP (ref 10.3–14.5)
RBC BLD AUTO: SIGNIFICANT CHANGE UP
SALICYLATES SERPL-MCNC: <1.7 MG/DL — LOW (ref 2.8–20)
SARS-COV-2 RNA SPEC QL NAA+PROBE: SIGNIFICANT CHANGE UP
SODIUM SERPL-SCNC: 138 MMOL/L — SIGNIFICANT CHANGE UP (ref 135–145)
THC UR QL: NEGATIVE — SIGNIFICANT CHANGE UP
VARIANT LYMPHS # BLD: 4 % — SIGNIFICANT CHANGE UP (ref 0–6)
WBC # BLD: 3.03 K/UL — LOW (ref 3.8–10.5)
WBC # FLD AUTO: 3.03 K/UL — LOW (ref 3.8–10.5)

## 2021-09-30 PROCEDURE — 90792 PSYCH DIAG EVAL W/MED SRVCS: CPT | Mod: 95

## 2021-09-30 PROCEDURE — 99285 EMERGENCY DEPT VISIT HI MDM: CPT

## 2021-09-30 PROCEDURE — 93010 ELECTROCARDIOGRAM REPORT: CPT

## 2021-09-30 RX ORDER — PALIPERIDONE 1.5 MG/1
0 TABLET, EXTENDED RELEASE ORAL
Qty: 0 | Refills: 0 | DISCHARGE

## 2021-09-30 NOTE — ED ADULT NURSE NOTE - CHIEF COMPLAINT QUOTE
GIOVANI- From [facility] [address]. Was arrested 2 days ago and needs medical eval before able to return  HX Schizophrenia

## 2021-09-30 NOTE — ED PROVIDER NOTE - NSFOLLOWUPINSTRUCTIONS_ED_ALL_ED_FT
Rest, drink plenty of fluids.  Advance activity as tolerated.  Continue all previously prescribed medications as directed.  Follow up with your PMD 2-3 days and bring copies of your results.  Return to the ER for worsening symptoms,
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
## 2021-09-30 NOTE — ED PROVIDER NOTE - CLINICAL SUMMARY MEDICAL DECISION MAKING FREE TEXT BOX
ddx: no evidence of psychosis  Plan: Confirmed with SW and ACT team that will need psych clearance/ psych labs, psych consult, reassess
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
## 2021-09-30 NOTE — ED BEHAVIORAL HEALTH ASSESSMENT NOTE - DESCRIPTION
see bh note     coivd screener (pt): +vacc; no known travel/exp/pos ag/ab GERD Lives on the grounds of [facility], [address], disabled

## 2021-09-30 NOTE — ED PROVIDER NOTE - PATIENT PORTAL LINK FT
You can access the FollowMyHealth Patient Portal offered by [facility] by registering at the following website: [email or web address]. By joining [employer]’s FollowMyHealth portal, you will also be able to view your health information using other applications (apps) compatible with our system.

## 2021-09-30 NOTE — ED ADULT TRIAGE NOTE - CHIEF COMPLAINT QUOTE
GIOVANI- From [facility] [address]. Was arrested 2 days ago and needs medical eval before able to return  HX Schizophrenia

## 2021-09-30 NOTE — ED ADULT NURSE REASSESSMENT NOTE - NS ED NURSE REASSESS COMMENT FT1
Patient received from SHAILA Snyder in bed PHall. E.O. maintained. Patient is awake, alert and oriented x3 in bed. No IV access. Denies SI/HI, visual/auditory hallucinations. Safety and environmental checks maintained. Patient informed of their plan of care. Patient has no further requests at this time.

## 2021-09-30 NOTE — ED BEHAVIORAL HEALTH ASSESSMENT NOTE - RISK ASSESSMENT
H/o of schizophrenia and noncompliance with medication he is receiving a LOVELL, on AOT and has supportive residence. no hx of suicide attempt or SIB Low Acute Suicide Risk
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
## 2021-09-30 NOTE — BH SAFETY PLAN - STEP 1 WARNING SIGNS
Local Anesthesia Pre Procedure Assessment    Informed Consent:    Consent Obtained: Yes       Procedure Assessment:    Preop Diagnosis/Indications for Procedure: L L3 radiculopathy  Planned Procedure: L L3/4 TFESI  Planned Anesthetic: Local    Medical History/Comorbid Conditions:    Significant Medical/Surgical History: No  Normal Mental Status: Yes    Examination Pertinent to Procedure Being Performed:    Evaluation of Operative Site: skin c/d/i    Other Findings:    Reviewed Current Medications and Allergies: Yes    Pertinent Lab/Diagnostic Tests:    Pertinent Lab / Diagnostic Tests: None      Stanley Sullivan DO  10/12/2020    
.

## 2021-09-30 NOTE — ED ADULT NURSE NOTE - CAS DISC DELAYS
Render Post-Care Instructions In Note?: no
Duration Of Freeze Thaw-Cycle (Seconds): 5
Total Number Of Aks Treated: 4
Consent: The patient's consent was obtained including but not limited to risks of crusting, scabbing, blistering, scarring, darker or lighter pigmentary change, recurrence, incomplete removal and infection.
Number Of Freeze-Thaw Cycles: 1 freeze-thaw cycle
Post-Care Instructions: I reviewed with the patient in detail post-care instructions. Patient is to wear sunprotection, and avoid picking at any of the treated lesions. Pt may apply Vaseline to crusted or scabbing areas.
Detail Level: Zone
Waiting ambulance service

## 2021-12-09 NOTE — ED PROVIDER NOTE - CROS ED NEURO ALL NEG
Patient Seen in: [facility] AND [facility] Emergency Department      History   Patient presents with:  Covid    Stated Complaint: covid+    Subjective:   45yo/f with no chronic medical problems reports to the ED with complaints of COVID symptoms x 36 hours.  Her Normal range of motion. Cervical back: Normal range of motion and neck supple. Skin:     General: Skin is warm and dry. Capillary Refill: Capillary refill takes less than 2 seconds. Findings: No rash.       Comments: Normal color   Neurolog will receive the infusion and be monitored for 60 minutes after.    They have been advised that they should continue to self-isolate and use infection control measures (e.g., wear mask, isolate, social distance, avoid sharing personal items, clean and disin negative...

## 2022-04-07 NOTE — ED PROVIDER NOTE - CROS ED CONS ALL NEG
negative... Ear Wedge Repair Text: A wedge excision was completed by carrying down an excision through the full thickness of the ear and cartilage with an inward facing Burow's triangle. The wound was then closed in a layered fashion.

## 2022-05-09 ENCOUNTER — EMERGENCY (EMERGENCY)
Facility: HOSPITAL | Age: 37
LOS: 1 days | Discharge: HOME | End: 2022-05-09
Admitting: EMERGENCY MEDICINE
Payer: MEDICAID

## 2022-05-09 VITALS
RESPIRATION RATE: 16 BRPM | HEART RATE: 82 BPM | SYSTOLIC BLOOD PRESSURE: 126 MMHG | OXYGEN SATURATION: 99 % | TEMPERATURE: 98 F | DIASTOLIC BLOOD PRESSURE: 92 MMHG

## 2022-05-09 VITALS
HEIGHT: 68 IN | TEMPERATURE: 99 F | SYSTOLIC BLOOD PRESSURE: 129 MMHG | OXYGEN SATURATION: 99 % | HEART RATE: 95 BPM | RESPIRATION RATE: 18 BRPM | DIASTOLIC BLOOD PRESSURE: 77 MMHG

## 2022-05-09 PROCEDURE — 99284 EMERGENCY DEPT VISIT MOD MDM: CPT
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
## 2022-05-09 NOTE — ED PROVIDER NOTE - PATIENT PORTAL LINK FT
You can access the FollowMyHealth Patient Portal offered by [facility] by registering at the following website: [email or web address]. By joining [employer]’s FollowMyHealth portal, you will also be able to view your health information using other applications (apps) compatible with our system.
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
## 2022-05-09 NOTE — ED BEHAVIORAL HEALTH NOTE - BEHAVIORAL HEALTH NOTE
Worker met with patient at bedside who refused to go to [facility] because of the location of [place]. Writer encouraged patient to return to his residence and reviewed coping skills with patient. Patient was provided with drop in center 24 hour (gathering place, living room) for additional resources. Patient states that he wants to be in the hospital for a vacation. Worker provided encouragement for the patient on his stressors. Patient was provided with two metro cards for travel.     Per provider, JEREMY Dugan, patient is cleared and is able to return to their previous residence,[facility] [address].  has spoken to Demetrio Levi ([phone]) . ,  confirmed that patients mode of transportation is metro card and that patient travels INDEPENDENTLY/. Clinical provider is in agreement with metro card back to group home. Verbal huddle regarding coordination of care completed with interdisciplinary team.
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
## 2022-05-09 NOTE — ED PROVIDER NOTE - NSFOLLOWUPINSTRUCTIONS_ED_ALL_ED_FT
Anxiety    Generalized anxiety disorder (PARK) is a mental disorder. It is defined as anxiety that is not necessarily related to specific events or activities or is out of proportion to said events. Symptoms include restlessness, fatigue, difficulty concentrations, irritability and difficulty concentrating. It may interfere with life functions, including relationships, work, and school. If you were started on a medication, make sure to take exactly as prescribed and follow up with a psychiatrist.    SEEK IMMEDIATE MEDICAL CARE IF YOU HAVE ANY OF THE FOLLOWING SYMPTOMS: thoughts about hurting killing yourself, thoughts about hurting or killing somebody else, hallucinations, or worsening depression.

## 2022-05-09 NOTE — ED PROVIDER NOTE - NSICDXPASTMEDICALHX_GEN_ALL_CORE_FT
PAST MEDICAL HISTORY:  GERD (gastroesophageal reflux disease)     Schizophrenia, unspecified type

## 2022-05-10 ENCOUNTER — EMERGENCY (EMERGENCY)
Facility: HOSPITAL | Age: 37
LOS: 1 days | Discharge: HOME | End: 2022-05-10
Attending: STUDENT IN AN ORGANIZED HEALTH CARE EDUCATION/TRAINING PROGRAM | Admitting: STUDENT IN AN ORGANIZED HEALTH CARE EDUCATION/TRAINING PROGRAM
Payer: MEDICAID

## 2022-05-10 VITALS
DIASTOLIC BLOOD PRESSURE: 59 MMHG | RESPIRATION RATE: 18 BRPM | SYSTOLIC BLOOD PRESSURE: 98 MMHG | OXYGEN SATURATION: 100 % | HEART RATE: 69 BPM

## 2022-05-10 VITALS
TEMPERATURE: 99 F | SYSTOLIC BLOOD PRESSURE: 113 MMHG | RESPIRATION RATE: 18 BRPM | HEIGHT: 68 IN | OXYGEN SATURATION: 98 % | DIASTOLIC BLOOD PRESSURE: 75 MMHG | HEART RATE: 89 BPM

## 2022-05-10 PROCEDURE — 73130 X-RAY EXAM OF HAND: CPT | Mod: 26,RT

## 2022-05-10 PROCEDURE — 99283 EMERGENCY DEPT VISIT LOW MDM: CPT

## 2022-05-10 NOTE — ED PROVIDER NOTE - PATIENT PORTAL LINK FT
You can access the FollowMyHealth Patient Portal offered by [facility] by registering at the following website: [email or web address]. By joining [employer]’s FollowMyHealth portal, you will also be able to view your health information using other applications (apps) compatible with our system.

## 2022-05-10 NOTE — ED PROVIDER NOTE - CLINICAL SUMMARY MEDICAL DECISION MAKING FREE TEXT BOX
37[place] with right hand pain after punching a wall, exam with no obv deformity or bony tenderness, low suspicion for fx but will get xr, sw for collateral and dc back. pt denies si/hi not exhibiting agitation or opposition
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
## 2022-05-10 NOTE — ED PROVIDER NOTE - OBJECTIVE STATEMENT
37yo M from [facility] [address] pw rigth hand pain. pt states he got frustrated and punched a wall. now has right hand pain, mostly over 5th mcp, but able to fully range it and no swelling noted.  denies si/hi, calm and cooperative

## 2022-05-10 NOTE — ED ADULT NURSE NOTE - OBJECTIVE STATEMENT
pt received to 24a.  aox4.  pt coming from [facility].  pt was frustrated and punched the door and injured his right hand.  no obvious injury noted.  pt john pain.  appears in NAD. awaiting xrays

## 2022-05-10 NOTE — ED PROVIDER NOTE - NSFOLLOWUPINSTRUCTIONS_ED_ALL_ED_FT
1) Follow up with your PCP within 1 week of being seen in the ED  2) Return to the ED immediately for new or worsening symptoms severe pain, numbness, tingling, fever  3) Please continue to take any home medications as prescribed  4) Your test results from your ED visit were discussed with you prior to discharge  5) You were provided with a copy of your test results

## 2022-05-10 NOTE — ED PROVIDER NOTE - PROGRESS NOTE DETAILS
José Live, PGY-2- spoke with social work, will eval pt and obtain collateral José Live, PGY-2- Patient without fx on XR. Using extremity, eating apple juice. Has been calm and cooperative in the ED. Per Juan Daniel, pt was more agitated than usual, however, pt is calm here and states he wanted a "vacation". No SI/HI. SW gave pt a metro card and spoke with Juan Daniel. Pt clear for dc. Discussed results of work up with patient. Patient agrees with plan to discharge home. All questions answered regarding plan. Strict return precautions given.
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
## 2022-05-10 NOTE — ED PROVIDER NOTE - PHYSICAL EXAMINATION
Gen:  Well appearning in NAD  Head:  NC/AT  Resp: No distress   Ext: no deformities, right hand, no obv swelling, bruising, no bony tenderness   Skin: warm and dry as visualized

## 2022-05-10 NOTE — PROVIDER CONTACT NOTE (OTHER) - BACKGROUND
Per provider, CHRISTIAN Live MD, patient is cleared and is able to return to their previous residence [facility].
Pt is from [facility] [phone] ext [phone]; spoke with staff member Clifford, who states pt was sent to the ER due to aggressive behaviors.

## 2022-08-02 NOTE — ED PROVIDER NOTE - CONSTITUTIONAL NOURISHMENT, MLM
Admit per [facility] to ED Tr1, 87yo female who has been weak all day today. Family was assisting her to the restroom when she passed out and was assisted to the floor. EMS found her unresponsive with room air sat of 74%. They assisted breathing with BVM. She had pinpoint pupils and was given Narcan 0.5mg IV x 2 doses. (1mg total) with minimal response. Kept eyes open after second dose. Breathing on her on upon arrival to ED.   
well

## 2022-09-22 ENCOUNTER — EMERGENCY (EMERGENCY)
Facility: HOSPITAL | Age: 37
LOS: 1 days | Discharge: HOME | End: 2022-09-22
Attending: STUDENT IN AN ORGANIZED HEALTH CARE EDUCATION/TRAINING PROGRAM | Admitting: STUDENT IN AN ORGANIZED HEALTH CARE EDUCATION/TRAINING PROGRAM

## 2022-09-22 VITALS
SYSTOLIC BLOOD PRESSURE: 117 MMHG | HEART RATE: 85 BPM | RESPIRATION RATE: 16 BRPM | DIASTOLIC BLOOD PRESSURE: 77 MMHG | OXYGEN SATURATION: 100 % | TEMPERATURE: 98 F

## 2022-09-22 VITALS
HEIGHT: 68 IN | SYSTOLIC BLOOD PRESSURE: 123 MMHG | TEMPERATURE: 98 F | RESPIRATION RATE: 16 BRPM | HEART RATE: 87 BPM | DIASTOLIC BLOOD PRESSURE: 77 MMHG | OXYGEN SATURATION: 100 %

## 2022-09-22 PROCEDURE — 99283 EMERGENCY DEPT VISIT LOW MDM: CPT

## 2022-09-22 NOTE — ED PROVIDER NOTE - OBJECTIVE STATEMENT
isrrael from [place] reported to have argument with staff and report limited adherence to meds. calm in ED. denies si hi  vh. patient reports leslye Dyson last week
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
## 2022-09-22 NOTE — ED PROVIDER NOTE - NSFOLLOWUPINSTRUCTIONS_ED_ALL_ED_FT
1) Please follow-up with your primary care doctor within the next 2-3 days.  Please call today for an appointment.   2) If you have any worsening of symptoms or any other concerns please return to the ED immediately.  3) Please continue taking your home medications as directed.

## 2022-09-22 NOTE — ED ADULT NURSE NOTE - OBJECTIVE STATEMENT
Pt presents to , alert&orientedx3, amb, pMHX Pt presents to , alert&orientedx3, amb, pMHX Schizophrenia, non-compliant with medications coming to ED for agitation towards [place] staff. Denies any violent behavior towards staff, SI/HI, AH/VH. Calm and cooperative upon arrival, laughing randomly at people. Pending collateral from .

## 2022-09-22 NOTE — ED PROVIDER NOTE - PHYSICAL EXAMINATION
Constitutional: No fever. no chills.   Eyes: No visual changes, eye pain or redness  HEENT: No throat pain, hearing changes, ear pain, nasal pain. No nose bleeding.  CV: No chest pain, no palpitations  Resp: No shortness of breath, no cough  GI: No abdominal pain. No nausea. no  vomiting.   MSK: No musculoskeletal pain  Skin: No rash, lesions, no bruises  Neuro: No headache. No numbness or tingling. No weakness. No dizziness.  Allergy/Immunology: no allergy to medicine

## 2022-09-22 NOTE — ED ADULT TRIAGE NOTE - CHIEF COMPLAINT QUOTE
Pt coming from [facility] 74, h/o schizophrenia non-compliant with his medication sent in for agitation at facility. Pt was being rude and aggressive to staff members.

## 2022-09-22 NOTE — ED ADULT NURSE NOTE - CHIEF COMPLAINT QUOTE
Pt coming from [facility] 74, h/o schizophrenia non-compliant with his medication sent in for agitation at facility. Pt was being rude and aggressive to staff members.

## 2022-09-22 NOTE — ED ADULT NURSE REASSESSMENT NOTE - NS ED NURSE REASSESS COMMENT FT1
Pt refusing to get on EMS stretcher, states " I can go home by myself". SW called to bedside to confirm mode of transportation for safe return to residence. Pt will be traveling via taxi.

## 2022-09-22 NOTE — ED PROVIDER NOTE - PATIENT PORTAL LINK FT
You can access the FollowMyHealth Patient Portal offered by [facility] by registering at the following website: [email or web address]. By joining [employer]’s FollowMyHealth portal, you will also be able to view your health information using other applications (apps) compatible with our system.

## 2022-09-24 ENCOUNTER — EMERGENCY (EMERGENCY)
Facility: HOSPITAL | Age: 37
LOS: 1 days | Discharge: HOME | End: 2022-09-24
Admitting: STUDENT IN AN ORGANIZED HEALTH CARE EDUCATION/TRAINING PROGRAM

## 2022-09-24 VITALS
OXYGEN SATURATION: 99 % | HEART RATE: 73 BPM | RESPIRATION RATE: 18 BRPM | DIASTOLIC BLOOD PRESSURE: 65 MMHG | TEMPERATURE: 98 F | SYSTOLIC BLOOD PRESSURE: 135 MMHG | HEIGHT: 68 IN

## 2022-09-24 PROCEDURE — 99284 EMERGENCY DEPT VISIT MOD MDM: CPT

## 2022-09-24 RX ORDER — HALOPERIDOL DECANOATE 100 MG/ML
5 INJECTION INTRAMUSCULAR ONCE
Refills: 0 | Status: COMPLETED | OUTPATIENT
Start: 2022-09-24 | End: 2022-09-24

## 2022-09-24 RX ADMIN — HALOPERIDOL DECANOATE 5 MILLIGRAM(S): 100 INJECTION INTRAMUSCULAR at 21:13
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
## 2022-09-24 NOTE — ED ADULT TRIAGE NOTE - PAIN: PRESENCE, MLM
GI PROGRESS NOTE  Vannesa Jimenez, RENATO  [phone] NP in-hospital cell phone M-F until 4:30  After 5pm or on weekends, please call  for physician on call    NAME:Mague Barragan :1936 IUL:[id number]   ATTG: Dr. Mellisa Plascencia    PCP: Mainor Braun MD  Date/Time:  2021 11:27 AM   Primary GI: Dr. Cristhian Gongora; Dr. Oleg Ghosh covering   Reason for following: Diverticulitis     Assessment:     Acute diverticulitis   · Patient denies any abdominal complaints; no pain or changes in stools, denies hematochezia/melena   · CT abd/pel shows findings consistent with acute diverticulitis involving the sigmoid colon. No evidence of abscess or perforation. Possible second focus of acute diverticulitis in the distal descending colon. · Zosyn started by primary team   · Hgb 7.7     GI history:  -EGD (20) by Dr. Cristhian Gongora for dysphagia/odynophagia/GERD showed minimal irregularity at the GE junction with no further ulceration noted (biopsied), followed by dilatation with 54FR Savary dilator; normal stomach; normal duodenum.     -Colonoscopy (20) by Dr. Cristhian Gongora for diverticulitis showed normal terminal ileum; sigmoid diverticulosis; small grade 1 internal hemorrhoids; no masses, polyps, or inflammation noted. -EGD (20) by Dr. Cristhian Gongora for dysphagia/odynophagia/iron deficiency anemia/melena showed a single 4 mm shallow round ulceration at the GE junction; normal stomach; normal duodenum.       Neutropenic fever  Sepsis  CML  Breast cancer  · Treatment per primary team      Plan:     · No direct plans for repeat colonoscopy at this time. · Continue IV Zoysn  · Monitor for s/s of bleeding. Goal for hgb >7.0; transfuse as clinically indicated   · Follow H&H  · Continue pantoprazole 40 mg PO   · Symptomatic care per primary team  · Nothing further to add from a GI standpoint. GI signing-off. Please call with any questions. Thank you for this consult.    *Plan of care discussed with Dr. Oleg Ghosh      Subjective: Discussed with RN events overnight. Patient resting comfortably in chair. No complaints of abdominal pain. No GI complaints at this time. Review of Systems:  Symptom Y/N Comments  Symptom Y/N Comments   Fever/Chills n   Chest Pain n    Cough n   Headaches n    Sputum n   Joint Pain n    SOB/AL n   Pruritis/Rash n    Tolerating Diet    Other       Could NOT obtain due to:      Objective:   VITALS:   Last 24hrs VS reviewed since prior progress note. Most recent are:  Visit Vitals  BP (!) 107/52 (BP 1 Location: Left upper arm, BP Patient Position: At rest)   Pulse 94   Temp 98.5 °F (36.9 °C)   Resp 23   Ht 5' 6\" (1.676 m)   Wt 88.5 kg (195 lb)   SpO2 98%   Breastfeeding No   BMI 31.47 kg/m²       Intake/Output Summary (Last 24 hours) at 2/12/2021 1127  Last data filed at 2/11/2021 1834  Gross per 24 hour   Intake 1001.7 ml   Output 1375 ml   Net -373.3 ml     PHYSICAL EXAM:  General: Pleasant elderly  female. NAD    HEENT: NC, Atraumatic. Anicteric sclerae. Lungs:  CTA Bilaterally. No Wheezing/Rhonchi/Rales. Heart:  Regular rate rhythm,  No murmur, No Rubs, No Gallops  Abdomen: Soft, Non distended, Non tender. +Bowel sounds, no HSM  Extremities: No c/c/e  Neurologic:  Alert and oriented X 3. No acute neurological distress   Psych:   Good insight. Not anxious nor agitated. Lab and Radiology Data Reviewed: (see below)    Medications Reviewed: (see below)  PMH/SH reviewed - no change compared to H&P  ________________________________________________________________________  Total time spent with patient: 20 minutes ________________________________________________________________________  Care Plan discussed with:  Patient x   Family     RN x              Consultant:       Chapito Ray NP     Procedures: see electronic medical records for all procedures/Xrays and details which were not copied into this note but were reviewed prior to creation of Plan.       LABS:  Recent Labs 02/11/21  0439 02/10/21  1541   WBC 0.2* 0.2*   HGB 7.7* 6.8*   HCT 22.9* 20.2*   PLT 7* 10*     Recent Labs     02/10/21  1541      K 3.7      CO2 24   BUN 17   CREA 0.69   *   CA 7.9*     Recent Labs     02/10/21  1541   AP 91   TP 7.3   ALB 3.1*   GLOB 4.2*   LPSE 52*     Recent Labs     02/10/21  1832   INR 1.2*   PTP 12.3*   APTT 27.5      No results for input(s): FE, TIBC, PSAT, FERR in the last 72 hours. Lab Results   Component Value Date/Time    Folate 22.8 (H) 07/21/2020 05:17 AM     No results for input(s): PH, PCO2, PO2 in the last 72 hours. No results for input(s): CPK, CKMB in the last 72 hours.     No lab exists for component: TROPONINI  Lab Results   Component Value Date/Time    Color YELLOW/STRAW 02/10/2021 05:23 PM    Appearance CLEAR 02/10/2021 05:23 PM    Specific gravity 1.020 02/10/2021 05:23 PM    Specific gravity 1.015 02/02/2021 02:40 PM    pH (UA) 6.5 02/10/2021 05:23 PM    Protein 30 (A) 02/10/2021 05:23 PM    Glucose Negative 02/10/2021 05:23 PM    Ketone Negative 02/10/2021 05:23 PM    Bilirubin Negative 02/10/2021 05:23 PM    Urobilinogen 1.0 02/10/2021 05:23 PM    Nitrites Negative 02/10/2021 05:23 PM    Leukocyte Esterase Negative 02/10/2021 05:23 PM    Epithelial cells FEW 02/10/2021 05:23 PM    Bacteria Negative 02/10/2021 05:23 PM    WBC 0-4 02/10/2021 05:23 PM    RBC 0-5 02/10/2021 05:23 PM       MEDICATIONS:  Current Facility-Administered Medications   Medication Dose Route Frequency    allopurinoL (ZYLOPRIM) tablet 300 mg  300 mg Oral DAILY    dicyclomine (BENTYL) capsule 10 mg  10 mg Oral BID    dilTIAZem ER (CARDIZEM CD) capsule 240 mg  240 mg Oral DAILY    DULoxetine (CYMBALTA) capsule 30 mg  30 mg Oral DAILY    folic acid (FOLVITE) tablet 1 mg  1 mg Oral DAILY    melatonin tablet 1.5 mg  1.5 mg Oral QHS    pantoprazole (PROTONIX) tablet 40 mg  40 mg Oral ACB&D    vitamins  A,C,E-zinc-copper (I-DARSHAN PROTECT) tablet 1 Tab  1 Tab Oral DAILY    sodium chloride (NS) flush 5-40 mL  5-40 mL IntraVENous Q8H    sodium chloride (NS) flush 5-40 mL  5-40 mL IntraVENous PRN    acetaminophen (TYLENOL) tablet 650 mg  650 mg Oral Q6H PRN    Or    acetaminophen (TYLENOL) suppository 650 mg  650 mg Rectal Q6H PRN    polyethylene glycol (MIRALAX) packet 17 g  17 g Oral DAILY PRN    promethazine (PHENERGAN) tablet 12.5 mg  12.5 mg Oral Q6H PRN    Or    ondansetron (ZOFRAN) injection 4 mg  4 mg IntraVENous Q6H PRN    furosemide (LASIX) injection 40 mg  40 mg IntraVENous DAILY    artificial tears (dextran-hypromellose-glycerin) (GENTEAL) ophthalmic solution 1 Drop  1 Drop Both Eyes TID PRN    piperacillin-tazobactam (ZOSYN) 3.375 g in 0.9% sodium chloride (MBP/ADV) 100 mL MBP  3.375 g IntraVENous Q8H    vancomycin (VANCOCIN) 1,000 mg in 0.9% sodium chloride 250 mL (VIAL-MATE)  1,000 mg IntraVENous Q12H    0.9% sodium chloride infusion 250 mL  250 mL IntraVENous PRN    acetaminophen (TYLENOL) tablet 650 mg  650 mg Oral Q6H PRN     Facility-Administered Medications Ordered in Other Encounters   Medication Dose Route Frequency    0.9% sodium chloride infusion   IntraVENous CONTINUOUS denies pain/discomfort

## 2022-09-24 NOTE — ED PROVIDER NOTE - PATIENT PORTAL LINK FT
You can access the FollowMyHealth Patient Portal offered by [facility] by registering at the following website: [email or web address]. By joining [employer]’s FollowMyHealth portal, you will also be able to view your health information using other applications (apps) compatible with our system.

## 2022-09-24 NOTE — ED PROVIDER NOTE - CLINICAL SUMMARY MEDICAL DECISION MAKING FREE TEXT BOX
35 y/o M  hx Schizophrenia    SW consulted. Medication offered. Discuss plan with .   Medical evaluation performed. There is no clinical evidence of intoxication or any acute medical problem requiring immediate intervention.
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
## 2022-11-11 NOTE — ED PROVIDER NOTE - NEUROLOGICAL, MLM
PRINCIPAL DISCHARGE DIAGNOSIS  Diagnosis: Chest pain  Assessment and Plan of Treatment: You were brought in for chest pain, cardiology following. Ruled out heart attack, per cardiology no intervention inpatient. Follow up with your primary care or cardiologist within 1 week for further care.      SECONDARY DISCHARGE DIAGNOSES  Diagnosis: Essential hypertension  Assessment and Plan of Treatment: Continue blood pressure medication regimen as directed. Monitor for any visual changes, headaches or dizziness.  Monitor blood pressure regularly.  Follow up with your primary care provider for further management for high blood pressure.       Alert and oriented, no focal deficits, no motor or sensory deficits. PRINCIPAL DISCHARGE DIAGNOSIS  Diagnosis: Chest pain  Assessment and Plan of Treatment: You were brought in for chest pain, now resolved. You were evaluated by the cardiologist. Ruled out heart attack, per cardiology no intervention or further cardiac workup recommended at this time. Continue current medical management.   As per goals of discussion with your family, You are being discharged home with Hospice services and it is recommended to focus on comfort measures and supportive care. Continue with medications prescribed for pain and other symptom control.   Follow up with Primary care or Hospice team on discharge   If you have questions or concerns about your medications, you may contact the your Hospice service line by calling [phone].      SECONDARY DISCHARGE DIAGNOSES  Diagnosis: Aortic stenosis, severe  Assessment and Plan of Treatment: History of severe Aortic stenosis, As per Cardiologist> deemed not a surgical candidate for TAVR, as per goals of care discussion. Continue medical management.   **You have a followup electrophysiology appointment on 11/29/22 at 11:45am at [address]    Diagnosis: Essential hypertension  Assessment and Plan of Treatment: Stable, Continue blood pressure medication regimen as directed. Monitor for any visual changes, headaches or dizziness.  Monitor blood pressure regularly.  Follow up with your primary care provider for further management for high blood pressure.    Diagnosis: Dementia  Assessment and Plan of Treatment: stable Please continue your medications as prescribed and allow help with daily acitivities of living. Maintain a safe environment and make movements in a careful manner to prevent falls. Ensure that you are eating and drinking adequately and maintaining a healthy sleep cycle. If you are in need of assistance with medication adjustment you can follow-up with your outpatient provider or refer to the [facility]iatric [facility]iatry clinic by calling [phone].  [facility] Geriatric [facility]iatry Appointment -[phone] [place] [id number]

## 2023-01-12 ENCOUNTER — EMERGENCY (EMERGENCY)
Facility: HOSPITAL | Age: 38
LOS: 1 days | Discharge: HOME | End: 2023-01-12
Admitting: EMERGENCY MEDICINE
Payer: MEDICAID

## 2023-01-12 VITALS
HEART RATE: 81 BPM | DIASTOLIC BLOOD PRESSURE: 76 MMHG | TEMPERATURE: 98 F | SYSTOLIC BLOOD PRESSURE: 112 MMHG | RESPIRATION RATE: 18 BRPM | OXYGEN SATURATION: 97 %

## 2023-01-12 PROCEDURE — 99284 EMERGENCY DEPT VISIT MOD MDM: CPT
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
## 2023-01-12 NOTE — ED PROVIDER NOTE - NSFOLLOWUPINSTRUCTIONS_ED_ALL_ED_FT
Follow up with your primary care physician and psychiatrist in 48-72 hours.  You may also see the psychiatrist at [facility]:    [address] [place] [address]  Phone: [phone]    [facility] on [facility] Information:    -Walk-in hours: Monday to Friday, 9am to 3pm   -Almost all walk-in patients will be able to see a psych prescriber the same day   -Scheduled, non-urgent, evening remote/virtual appointments are available on a limited basis. Call our  to inquire about these: [phone]. A crisis center clinician screens these requests in the late afternoon and if appropriate it takes a few days to set-up.   -Visits take about 2 to 4 hours total   -Mornings are the best time for patients to arrive    For Telehealth options try:  [employer]: [email or web address] (to access psychiatrist or therapist)  [employer]: "[employer] (to access psychiatrist or therapist)  Better Help: betterhelp.com (Largest online therapy group)      SEEK IMMEDIATE MEDICAL CARE IF YOU HAVE ANY OF THE FOLLOWING SYMPTOMS: thoughts about hurting or killing yourself, thoughts about hurting or killing somebody else, hallucinations or any other worsening or persistent symptoms OR ANY NEW OR CONCERNING SYMPTOMS.
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
## 2023-01-12 NOTE — ED PROVIDER NOTE - PATIENT PORTAL LINK FT
You can access the FollowMyHealth Patient Portal offered by [facility] by registering at the following website: [email or web address]. By joining [employer]’s FollowMyHealth portal, you will also be able to view your health information using other applications (apps) compatible with our system.
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
## 2023-01-12 NOTE — ED BEHAVIORAL HEALTH NOTE - BEHAVIORAL HEALTH NOTE
Per provider, JEREMY GARCIA , patient is cleared and is able to return to their previous residence, [facility].  has spoken to ON CALL  Ms. Cisse  confirmed that patients mode of transportation is TAXI and that patient travels INDEPENDENTLY. Clinical provider is in agreement with TAXI back to group home. Verbal huddle regarding coordination of care completed with interdisciplinary team. Per provider, JEREMY GARCIA , patient is cleared and is able to return to their previous residence, [facility].  has spoken to ON CALL  Ms. Cisse  confirmed that patients mode of transportation is TAXI and that patient travels INDEPENDENTLY. Clinical provider is in agreement with TAXI back to group home. Verbal huddle regarding coordination of care completed with interdisciplinary team.    addendum: Writer scheduled taxi via [employer] (Inv# [id number]) w/ [employer] [phone].
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
## 2023-06-30 NOTE — ED ADULT NURSE REASSESSMENT NOTE - NS ED NURSE REASSESS COMMENT FT1
Consent: Prior to the procedure, written consent was obtained and risks were reviewed, including but not limited to: redness, peeling, blistering, pigmentary change, scarring, infection, and pain. Patient is aware multiple treatments may be necessary to achieve the desired outcome. EMS activated, pt clear for DC by provider. Calm and cooperative at this time. Pending transport Prep: The treated area was degreased with pre-peel cleanser, and vaseline was applied for protection of mucous membranes. Post Peel Care: Immediately after the peel, topical 6% retinol creme was applied, followed by topical calming cream. The patient was given these to take home and instructed to apply the retinol cream the next day and the calming creme as needed for dryness. Detail Level: Zone Treatment Number: 0 Post-Care Instructions: I reviewed with the patient in detail post-care instructions. Patient should avoid sun exposure and wear sun protection. Price (Use Numbers Only, No Special Characters Or $): 250.00

## 2023-08-24 NOTE — ED PROVIDER NOTE - PSYCHIATRIC, MLM
Alert and oriented to person, place, time/situation. normal mood and affect. no apparent risk to self or others. No indicators present

## 2023-10-04 NOTE — ED PROVIDER NOTE - CONSTITUTIONAL APPEARANCE HYGIENE, MLM
on the discharge service for the patient. I have reviewed and made amendments to the documentation where necessary.
well appearing

## 2023-12-25 ENCOUNTER — EMERGENCY (EMERGENCY)
Facility: HOSPITAL | Age: 38
LOS: 1 days | Discharge: HOME | End: 2023-12-25
Admitting: EMERGENCY MEDICINE
Payer: MEDICAID

## 2023-12-25 VITALS
HEART RATE: 107 BPM | TEMPERATURE: 98 F | RESPIRATION RATE: 18 BRPM | SYSTOLIC BLOOD PRESSURE: 117 MMHG | OXYGEN SATURATION: 96 % | DIASTOLIC BLOOD PRESSURE: 83 MMHG

## 2023-12-25 PROCEDURE — 70450 CT HEAD/BRAIN W/O DYE: CPT | Mod: 26,MA

## 2023-12-25 PROCEDURE — 70486 CT MAXILLOFACIAL W/O DYE: CPT | Mod: 26,MA

## 2023-12-25 PROCEDURE — 99284 EMERGENCY DEPT VISIT MOD MDM: CPT

## 2023-12-25 NOTE — ED PROVIDER NOTE - PATIENT PORTAL LINK FT
You can access the FollowMyHealth Patient Portal offered by [facility] by registering at the following website: [email or web address]. By joining [employer]’s FollowMyHealth portal, you will also be able to view your health information using other applications (apps) compatible with our system. You can access the FollowMyHealth Patient Portal offered by [facility] by registering at the following website: [email or web address]. By joining [employer]’s FollowMyHealth portal, you will also be able to view your health information using other applications (apps) compatible with our system.
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
## 2023-12-25 NOTE — ED PROVIDER NOTE - NSFOLLOWUPINSTRUCTIONS_ED_ALL_ED_FT
Rest, drink plenty of fluids.  Advance activity as tolerated.  Continue all previously prescribed medications as directed.  Follow up with your primary care physician in 48-72 hours- bring copies of your results.  Return to the ER for worsening or persistent symptoms, and/or ANY NEW OR CONCERNING SYMPTOMS. If you have issues obtaining follow up, please call: [phone] to obtain a doctor or specialist who takes your insurance in your area.  You may call [phone] to make an appointment with the internal medicine clinic. Rest, drink plenty of fluids.  Advance activity as tolerated.  Continue all previously prescribed medications as directed.  Follow up with your primary care physician in 48-72 hours- bring copies of your results.  Return to the ER for worsening or persistent symptoms, and/or ANY NEW OR CONCERNING SYMPTOMS. If you have issues obtaining follow up, please call: [phone] to obtain a doctor or specialist who takes your insurance in your area.  You may call [phone] to make an appointment with the internal medicine clinic.
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
## 2024-04-24 NOTE — ED ADULT NURSE NOTE - NS ED NURSE LEVEL OF CONSCIOUSNESS AFFECT
4/24. Discharge order noted. Family requests [facility]. Referral made. Transportation arranged with the facility for 1:00. Nursing, Hanna cerda, notified of the time. Hanna will call pt's  and notify him of the time. Aleah Gonzalez RN     Calm/Appropriate

## 2024-08-15 NOTE — ED ADULT TRIAGE NOTE - NS ED NURSE BANDS TYPE
Called patient with lab results. Patient states that he cannot get the tirzepatide and that he has a months worth of Trulicity. He would like to have the Trulicity to be called in for the next 6 months. Please advise and sign if you agree.    Name band;

## 2024-08-20 NOTE — ED ADULT TRIAGE NOTE - WEIGHT IN LBS
Spoke with daughter and advised that I do not see any records in the chart. I did speak with Dr. Cortes's AMANDA NÚÑEZ Who states she did not see any records. Patient has an appointment with Enriqueta Santiago tomorrow and we will have her sign a release of records   139.9

## 2024-10-24 NOTE — ED BEHAVIORAL HEALTH ASSESSMENT NOTE - MODIFICATIONS
Occupational therapy to evaluate and treat. Activity: out of bed to chair I have seen and examined the patient with NP RENZO Gayle  and performed iyer elements of the History and Mental Status Examination.  I concur with her assessment and recommendations.   I have discussed the Pt's assessment and plan of care with NP RENZO Gayle.   I agree with the note as stated above, having amended the EMR as needed to reflect my findings.  This includes during the time I functioned as the attending physician for this Pt at the -ED of [facility].
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
## 2025-03-12 NOTE — ED ADULT TRIAGE NOTE - NS ED TRIAGE HISTORIAN
Please follow up with Dr. Johnston, call the office to confirm appointment at [phone]    Please follow up with Dr. Neil Ernandez, call the office to confirm appointment at [phone]    Please follow up with Dr. Mera from Infectious Disease    Please follow up with Dr. Hubbard from Oncology on 3/20 at 3PM    Please follow up with your primary care doctor
Patient
